# Patient Record
Sex: FEMALE | Race: WHITE | NOT HISPANIC OR LATINO | URBAN - NONMETROPOLITAN AREA
[De-identification: names, ages, dates, MRNs, and addresses within clinical notes are randomized per-mention and may not be internally consistent; named-entity substitution may affect disease eponyms.]

---

## 2020-09-23 ENCOUNTER — OFFICE VISIT (OUTPATIENT)
Dept: PHARMACY | Facility: HOSPITAL | Age: 40
End: 2020-09-23

## 2020-09-23 VITALS
OXYGEN SATURATION: 98 % | SYSTOLIC BLOOD PRESSURE: 129 MMHG | HEART RATE: 72 BPM | WEIGHT: 270 LBS | BODY MASS INDEX: 40.92 KG/M2 | DIASTOLIC BLOOD PRESSURE: 85 MMHG | HEIGHT: 68 IN

## 2020-09-23 DIAGNOSIS — B19.10 HEPATITIS B VIRUS INFECTION, UNSPECIFIED CHRONICITY: ICD-10-CM

## 2020-09-23 DIAGNOSIS — Z82.49 FAMILY HISTORY OF HEART DISEASE: ICD-10-CM

## 2020-09-23 DIAGNOSIS — R01.1 HEART MURMUR: ICD-10-CM

## 2020-09-23 DIAGNOSIS — B18.2 CHRONIC HEPATITIS C WITHOUT HEPATIC COMA (HCC): Primary | ICD-10-CM

## 2020-09-23 LAB
ALBUMIN SERPL-MCNC: 3.8 G/DL (ref 3.5–5.2)
ALBUMIN/GLOB SERPL: 1.1 G/DL
ALP SERPL-CCNC: 100 U/L (ref 39–117)
ALPHA-FETOPROTEIN: 1.64 NG/ML (ref 0–8.3)
ALT SERPL W P-5'-P-CCNC: 67 U/L (ref 1–33)
ANION GAP SERPL CALCULATED.3IONS-SCNC: 5.3 MMOL/L (ref 5–15)
AST SERPL-CCNC: 60 U/L (ref 1–32)
BACTERIA UR QL AUTO: ABNORMAL /HPF
BASOPHILS # BLD AUTO: 0.05 10*3/MM3 (ref 0–0.2)
BASOPHILS NFR BLD AUTO: 0.9 % (ref 0–1.5)
BILIRUB SERPL-MCNC: 0.3 MG/DL (ref 0–1.2)
BILIRUB UR QL STRIP: NEGATIVE
BUN SERPL-MCNC: 12 MG/DL (ref 6–20)
BUN/CREAT SERPL: 22.2 (ref 7–25)
CALCIUM SPEC-SCNC: 8.4 MG/DL (ref 8.6–10.5)
CHLORIDE SERPL-SCNC: 106 MMOL/L (ref 98–107)
CHOLEST SERPL-MCNC: 157 MG/DL (ref 0–200)
CLARITY UR: CLEAR
CO2 SERPL-SCNC: 25.7 MMOL/L (ref 22–29)
COLOR UR: YELLOW
CREAT SERPL-MCNC: 0.54 MG/DL (ref 0.57–1)
DEPRECATED RDW RBC AUTO: 42.9 FL (ref 37–54)
EOSINOPHIL # BLD AUTO: 0.22 10*3/MM3 (ref 0–0.4)
EOSINOPHIL NFR BLD AUTO: 3.8 % (ref 0.3–6.2)
ERYTHROCYTE [DISTWIDTH] IN BLOOD BY AUTOMATED COUNT: 13.2 % (ref 12.3–15.4)
GFR SERPL CREATININE-BSD FRML MDRD: 124 ML/MIN/1.73
GLOBULIN UR ELPH-MCNC: 3.6 GM/DL
GLUCOSE SERPL-MCNC: 74 MG/DL (ref 65–99)
GLUCOSE UR STRIP-MCNC: NEGATIVE MG/DL
HCG SERPL QL: NEGATIVE
HCT VFR BLD AUTO: 41.9 % (ref 34–46.6)
HDLC SERPL-MCNC: 58 MG/DL (ref 40–60)
HGB BLD-MCNC: 13.7 G/DL (ref 12–15.9)
HGB UR QL STRIP.AUTO: ABNORMAL
HIV1+2 AB SER QL: NORMAL
HYALINE CASTS UR QL AUTO: ABNORMAL /LPF
IMM GRANULOCYTES # BLD AUTO: 0.02 10*3/MM3 (ref 0–0.05)
IMM GRANULOCYTES NFR BLD AUTO: 0.3 % (ref 0–0.5)
INR PPP: 0.98 (ref 0.9–1.1)
KETONES UR QL STRIP: NEGATIVE
LDLC SERPL CALC-MCNC: 87 MG/DL (ref 0–100)
LDLC/HDLC SERPL: 1.5 {RATIO}
LEUKOCYTE ESTERASE UR QL STRIP.AUTO: NEGATIVE
LYMPHOCYTES # BLD AUTO: 1.78 10*3/MM3 (ref 0.7–3.1)
LYMPHOCYTES NFR BLD AUTO: 30.7 % (ref 19.6–45.3)
MCH RBC QN AUTO: 28.6 PG (ref 26.6–33)
MCHC RBC AUTO-ENTMCNC: 32.7 G/DL (ref 31.5–35.7)
MCV RBC AUTO: 87.5 FL (ref 79–97)
MONOCYTES # BLD AUTO: 0.43 10*3/MM3 (ref 0.1–0.9)
MONOCYTES NFR BLD AUTO: 7.4 % (ref 5–12)
NEUTROPHILS NFR BLD AUTO: 3.3 10*3/MM3 (ref 1.7–7)
NEUTROPHILS NFR BLD AUTO: 56.9 % (ref 42.7–76)
NITRITE UR QL STRIP: NEGATIVE
NRBC BLD AUTO-RTO: 0 /100 WBC (ref 0–0.2)
PH UR STRIP.AUTO: 8.5 [PH] (ref 5–8)
PLATELET # BLD AUTO: 144 10*3/MM3 (ref 140–450)
PMV BLD AUTO: 11.2 FL (ref 6–12)
POTASSIUM SERPL-SCNC: 4.3 MMOL/L (ref 3.5–5.2)
PROT SERPL-MCNC: 7.4 G/DL (ref 6–8.5)
PROT UR QL STRIP: NEGATIVE
PROTHROMBIN TIME: 12.8 SECONDS (ref 11.9–14.1)
RBC # BLD AUTO: 4.79 10*6/MM3 (ref 3.77–5.28)
RBC # UR: ABNORMAL /HPF
REF LAB TEST METHOD: ABNORMAL
SODIUM SERPL-SCNC: 137 MMOL/L (ref 136–145)
SP GR UR STRIP: 1.01 (ref 1–1.03)
SQUAMOUS #/AREA URNS HPF: ABNORMAL /HPF
TRIGL SERPL-MCNC: 59 MG/DL (ref 0–150)
TSH SERPL DL<=0.05 MIU/L-ACNC: 3.4 UIU/ML (ref 0.27–4.2)
UROBILINOGEN UR QL STRIP: ABNORMAL
VLDLC SERPL-MCNC: 11.8 MG/DL (ref 5–40)
WBC # BLD AUTO: 5.8 10*3/MM3 (ref 3.4–10.8)
WBC UR QL AUTO: ABNORMAL /HPF

## 2020-09-23 PROCEDURE — 87340 HEPATITIS B SURFACE AG IA: CPT

## 2020-09-23 PROCEDURE — 99204 OFFICE O/P NEW MOD 45 MIN: CPT | Performed by: PHYSICIAN ASSISTANT

## 2020-09-23 PROCEDURE — 86707 HEPATITIS BE ANTIBODY: CPT

## 2020-09-23 PROCEDURE — 87522 HEPATITIS C REVRS TRNSCRPJ: CPT

## 2020-09-23 PROCEDURE — 80053 COMPREHEN METABOLIC PANEL: CPT

## 2020-09-23 PROCEDURE — 80061 LIPID PANEL: CPT

## 2020-09-23 PROCEDURE — 86704 HEP B CORE ANTIBODY TOTAL: CPT

## 2020-09-23 PROCEDURE — 86692 HEPATITIS DELTA AGENT ANTBDY: CPT

## 2020-09-23 PROCEDURE — 84443 ASSAY THYROID STIM HORMONE: CPT

## 2020-09-23 PROCEDURE — 85025 COMPLETE CBC W/AUTO DIFF WBC: CPT

## 2020-09-23 PROCEDURE — 84703 CHORIONIC GONADOTROPIN ASSAY: CPT

## 2020-09-23 PROCEDURE — 36415 COLL VENOUS BLD VENIPUNCTURE: CPT

## 2020-09-23 PROCEDURE — 81001 URINALYSIS AUTO W/SCOPE: CPT

## 2020-09-23 PROCEDURE — 81596 NFCT DS CHRNC HCV 6 ASSAYS: CPT

## 2020-09-23 PROCEDURE — 87517 HEPATITIS B DNA QUANT: CPT

## 2020-09-23 PROCEDURE — 86705 HEP B CORE ANTIBODY IGM: CPT

## 2020-09-23 PROCEDURE — 86708 HEPATITIS A ANTIBODY: CPT

## 2020-09-23 PROCEDURE — 87350 HEPATITIS BE AG IA: CPT

## 2020-09-23 PROCEDURE — G0432 EIA HIV-1/HIV-2 SCREEN: HCPCS

## 2020-09-23 PROCEDURE — 82105 ALPHA-FETOPROTEIN SERUM: CPT

## 2020-09-23 PROCEDURE — 85610 PROTHROMBIN TIME: CPT

## 2020-09-23 PROCEDURE — 86706 HEP B SURFACE ANTIBODY: CPT

## 2020-09-23 RX ORDER — HYDROXYZINE PAMOATE 50 MG/1
50 CAPSULE ORAL 2 TIMES DAILY
COMMUNITY

## 2020-09-23 RX ORDER — MELOXICAM 7.5 MG/1
7.5 TABLET ORAL 2 TIMES DAILY
COMMUNITY

## 2020-09-23 RX ORDER — METHOCARBAMOL 750 MG/1
750 TABLET, FILM COATED ORAL 3 TIMES DAILY
COMMUNITY

## 2020-09-23 RX ORDER — ESCITALOPRAM OXALATE 20 MG/1
20 TABLET ORAL DAILY
COMMUNITY

## 2020-09-23 RX ORDER — BUPRENORPHINE HYDROCHLORIDE AND NALOXONE HYDROCHLORIDE DIHYDRATE 8; 2 MG/1; MG/1
1 TABLET SUBLINGUAL 2 TIMES DAILY
COMMUNITY

## 2020-09-23 NOTE — PROGRESS NOTES
: 1980    Chief Complaint   Patient presents with   • Hepatitis C   • Hepatitis B       Crystal Cerrato is a 39 y.o. female who presents to the office today as a new patient (self- referred) for evaluation of Hepatitis C and Hepatitis B.    History of Present Illness:  She found out about having Hepatitis C infection approx 15 years ago. She has not had prior treatment for hepatitis. Also has chronic Hepatitis B infection per her report, known for the past 1 year. She recently completed drug rehab, was living at [facility] for 2 months, has been clean now for 90 days. No known family history of liver disease or cirrhosis. She admits to previous IVDU and intranasal drug use. She does have nonprofessional tattoos. Admits to having previous alcoholism, drank daily for more than 5 years. She does not currently drink alcohol. She denies current illicit drug use including marijuana. No recent liver imaging. She has not had recent labs. She has had previous vaccinations for Hepatitis A but that was during the time that she was told she had acute hepatitis A, B and C. She is currently unemployed, disabled. The patient receives support from her friends that she is living with.    Review of Systems   Constitutional: Positive for fatigue. Negative for chills and fever.   HENT: Negative for trouble swallowing.    Eyes: Negative.    Respiratory: Negative for cough, choking, chest tightness and shortness of breath.    Cardiovascular: Negative for chest pain.   Gastrointestinal: Positive for abdominal distention, abdominal pain, constipation and nausea. Negative for anal bleeding, blood in stool, diarrhea and vomiting.   Genitourinary: Negative for difficulty urinating.   Musculoskeletal: Positive for back pain. Negative for neck pain.   Skin: Negative for color change, pallor, rash and wound.   Allergic/Immunologic: Negative for environmental allergies and food allergies.   Neurological: Positive for  light-headedness. Negative for dizziness and headaches.   Hematological: Does not bruise/bleed easily.   Psychiatric/Behavioral: Negative.      I have reviewed and confirmed the accuracy of the ROS as documented by the MA/LPN/RN Marisela Mesa PA-C    Past Medical History:   Diagnosis Date   • Bipolar disorder, unspecified (CMS/HCC)    • Chronic upper extremity pain    • COPD (chronic obstructive pulmonary disease) (CMS/HCC)    • Drug abuse (CMS/HCC)    • Hepatitis B, chronic (CMS/HCC)    • Hepatitis C infection    • PTSD (post-traumatic stress disorder)    • Viral hepatitis A        Past Surgical History:   Procedure Laterality Date   • CHOLECYSTECTOMY     • MANDIBLE SURGERY     • SHOULDER SURGERY      x3, right       Family History   Problem Relation Age of Onset   • Diabetes Mother    • Hypertension Mother    • Heart attack Mother    • Diabetes Father    • Hypertension Father    • Mental illness Sister    • Hepatitis Brother        Social History     Socioeconomic History   • Marital status: Unknown     Spouse name: Not on file   • Number of children: Not on file   • Years of education: Not on file   • Highest education level: Not on file   Tobacco Use   • Smoking status: Current Every Day Smoker     Packs/day: 1.00     Years: 30.00     Pack years: 30.00     Types: Cigarettes   • Smokeless tobacco: Never Used   Substance and Sexual Activity   • Alcohol use: Not Currently     Comment: previous alcoholism   • Drug use: Not Currently     Comment: previous drug use   • Sexual activity: Defer       Current Outpatient Medications:   •  ALBUTEROL IN, Inhale As Needed., Disp: , Rfl:   •  buprenorphine-naloxone (SUBOXONE) 8-2 MG per SL tablet, Place 1 tablet under the tongue 2 (two) times a day., Disp: , Rfl:   •  Cariprazine HCl 6 MG capsule, Take 6 mg by mouth Daily., Disp: , Rfl:   •  escitalopram (LEXAPRO) 20 MG tablet, Take 20 mg by mouth Daily., Disp: , Rfl:   •  Fluticasone Furoate-Vilanterol (BREO ELLIPTA IN),  "Inhale Daily., Disp: , Rfl:   •  hydrOXYzine pamoate (VISTARIL) 50 MG capsule, Take 50 mg by mouth 2 (two) times a day., Disp: , Rfl:   •  meloxicam (MOBIC) 7.5 MG tablet, Take 7.5 mg by mouth 2 (two) times a day., Disp: , Rfl:   •  methocarbamol (ROBAXIN) 750 MG tablet, Take 750 mg by mouth 3 (Three) Times a Day., Disp: , Rfl:   •  VB-Iabmomoeukt-Xlsah-DM (RESPERAL PO), Take 3 mg by mouth Daily., Disp: , Rfl:     Allergies:   Seroquel [quetiapine], Tylenol pm extra [diphenhydramine-apap (sleep)], and Zanaflex [tizanidine]    Vitals:  /85   Pulse 72   Ht 172.7 cm (68\")   Wt 122 kg (270 lb)   SpO2 98%   BMI 41.05 kg/m²     Physical Exam  Vitals signs reviewed.   Constitutional:       General: She is not in acute distress.     Appearance: Normal appearance. She is well-developed. She is not ill-appearing or diaphoretic.   HENT:      Head: Normocephalic and atraumatic.      Right Ear: External ear normal.      Left Ear: External ear normal.      Nose: Nose normal.      Mouth/Throat:      Comments: Wearing a mask  Eyes:      General: No scleral icterus.        Right eye: No discharge.         Left eye: No discharge.      Conjunctiva/sclera: Conjunctivae normal.   Neck:      Musculoskeletal: Normal range of motion.      Vascular: No JVD.   Cardiovascular:      Rate and Rhythm: Normal rate and regular rhythm.      Heart sounds: Murmur (systolic, grade 3/6) present. No friction rub. No gallop.    Pulmonary:      Effort: Pulmonary effort is normal. No respiratory distress.      Breath sounds: Normal breath sounds. No wheezing or rales.   Chest:      Chest wall: No tenderness.   Abdominal:      General: Bowel sounds are normal. There is no distension.      Palpations: Abdomen is soft. There is no mass.      Tenderness: There is no abdominal tenderness. There is no guarding.   Musculoskeletal: Normal range of motion.         General: No deformity.   Skin:     General: Skin is warm and dry.      Findings: No " erythema or rash.   Neurological:      Mental Status: She is alert and oriented to person, place, and time.      Coordination: Coordination normal.   Psychiatric:         Mood and Affect: Mood normal.         Behavior: Behavior normal.         Thought Content: Thought content normal.         Judgment: Judgment normal.       Results Review:  None available    Assessment:  1. Chronic hepatitis C without hepatic coma (CMS/HCC)    2. Hepatitis B virus infection, unspecified chronicity    3. Heart murmur    4. Family history of heart disease      Plan:  Orders Placed This Encounter   Procedures   • US Liver   • AFP Tumor Marker   • Comprehensive Metabolic Panel   • hCG, Serum, Qualitative   • HCV FibroSURE   • HCV RNA By PCR, Qn Rfx Ofelia   • Hepatitis A Antibody, Total   • HIV-1 / O / 2 Ag / Antibody 4th Generation   • Protime-INR   • TSH   • Hepatitis B DNA, Quantitative, PCR   • Hepatitis B Virus Profile   • Hepatitis Delta Virus   • Lipid Panel   • Urinalysis With Microscopic If Indicated (No Culture) - Urine, Clean Catch   • CBC Auto Differential   • Urinalysis, Microscopic Only - Urine, Clean Catch   • Ambulatory Referral to Cardiology   • CBC & Differential     Will complete labs and imaging for further evaluation. Will need treatment of Hepatitis B virus if chronic first before treating Hepatitis C infection. Will discuss more in detail when results have been reviewed.     Referral to cardiology due to new heart murmur and family history.         Return in about 2 weeks (around 10/7/2020) for discussion of results.      Electronically signed 9/24/2020 17:13 GEORGET  Marisela Mesa PA-C  [facility], [place]

## 2020-09-24 ENCOUNTER — TELEPHONE (OUTPATIENT)
Dept: GASTROENTEROLOGY | Facility: CLINIC | Age: 40
End: 2020-09-24

## 2020-09-24 LAB — HAV AB SER QL IA: POSITIVE

## 2020-09-25 LAB
HCV GENTYP SERPL NAA+PROBE: NORMAL
HCV RNA SERPL NAA+PROBE-ACNC: 110 IU/ML
HCV RNA SERPL NAA+PROBE-LOG IU: 2.04 LOG10 IU/ML
REF LAB TEST REF RANGE: NORMAL

## 2020-09-28 LAB
HBV CORE AB SERPL QL IA: POSITIVE
HBV CORE IGM SERPL QL IA: NEGATIVE
HBV E AB SERPL QL IA: NEGATIVE
HBV E AG SERPL QL IA: POSITIVE
HBV SURFACE AB SER QL: NON REACTIVE
HBV SURFACE AG SERPL QL IA: POSITIVE

## 2020-09-29 LAB
A2 MACROGLOB SERPL-MCNC: 250 MG/DL (ref 110–276)
ALT SERPL W P-5'-P-CCNC: 64 IU/L (ref 0–40)
APO A-I SERPL-MCNC: 152 MG/DL (ref 116–209)
BILIRUB SERPL-MCNC: 0.3 MG/DL (ref 0–1.2)
FIBROSIS SCORING:: ABNORMAL
FIBROSIS STAGE SERPL QL: ABNORMAL
GGT SERPL-CCNC: 15 IU/L (ref 0–60)
HAPTOGLOB SERPL-MCNC: 101 MG/DL (ref 42–296)
HCV AB SER QL: ABNORMAL
LABORATORY COMMENT REPORT: ABNORMAL
LIVER FIBR SCORE SERPL CALC.FIBROSURE: 0.12 (ref 0–0.21)
NECROINFLAMM ACTIVITY SCORING:: ABNORMAL
NECROINFLAMMATORY ACT GRADE SERPL QL: ABNORMAL
NECROINFLAMMATORY ACT SCORE SERPL: 0.32 (ref 0–0.17)
SERVICE CMNT-IMP: ABNORMAL

## 2020-09-30 ENCOUNTER — TELEPHONE (OUTPATIENT)
Dept: CARDIOLOGY | Facility: CLINIC | Age: 40
End: 2020-09-30

## 2020-09-30 LAB — HDV AB SER QL IA: NEGATIVE

## 2020-09-30 NOTE — TELEPHONE ENCOUNTER
Left message on answering machine requesting patient return my call.  Patient needs a new patient appointment.

## 2020-10-02 ENCOUNTER — HOSPITAL ENCOUNTER (OUTPATIENT)
Dept: ULTRASOUND IMAGING | Facility: HOSPITAL | Age: 40
Discharge: HOME | End: 2020-10-02
Admitting: PHYSICIAN ASSISTANT

## 2020-10-02 ENCOUNTER — TELEPHONE (OUTPATIENT)
Dept: GASTROENTEROLOGY | Facility: CLINIC | Age: 40
End: 2020-10-02

## 2020-10-02 DIAGNOSIS — K76.9 LESION OF LIVER GREATER THAN 1 CM IN DIAMETER: Primary | ICD-10-CM

## 2020-10-02 DIAGNOSIS — B19.10 HEPATITIS B VIRUS INFECTION, UNSPECIFIED CHRONICITY: ICD-10-CM

## 2020-10-02 DIAGNOSIS — B18.2 CHRONIC HEPATITIS C WITHOUT HEPATIC COMA (HCC): ICD-10-CM

## 2020-10-02 PROCEDURE — 76705 ECHO EXAM OF ABDOMEN: CPT | Performed by: RADIOLOGY

## 2020-10-02 PROCEDURE — 76705 ECHO EXAM OF ABDOMEN: CPT

## 2020-10-02 NOTE — TELEPHONE ENCOUNTER
I have tried to reach patient several times regarding another result. I am going to mail patient a letter requesting her to call the office.

## 2020-10-05 LAB
HBV DNA SERPL NAA+PROBE-ACNC: NORMAL IU/ML
HBV DNA SERPL NAA+PROBE-ACNC: NORMAL IU/ML
HBV DNA SERPL NAA+PROBE-LOG IU: 8.6 LOG10 IU/ML
REF LAB TEST REF RANGE: NORMAL

## 2020-10-07 ENCOUNTER — TELEPHONE (OUTPATIENT)
Dept: GASTROENTEROLOGY | Facility: CLINIC | Age: 40
End: 2020-10-07

## 2020-10-07 ENCOUNTER — OFFICE VISIT (OUTPATIENT)
Dept: PHARMACY | Facility: HOSPITAL | Age: 40
End: 2020-10-07

## 2020-10-07 VITALS
OXYGEN SATURATION: 96 % | SYSTOLIC BLOOD PRESSURE: 116 MMHG | DIASTOLIC BLOOD PRESSURE: 78 MMHG | BODY MASS INDEX: 40.92 KG/M2 | HEIGHT: 68 IN | HEART RATE: 93 BPM | WEIGHT: 270 LBS

## 2020-10-07 DIAGNOSIS — B18.2 CHRONIC HEPATITIS C WITHOUT HEPATIC COMA (HCC): ICD-10-CM

## 2020-10-07 DIAGNOSIS — B18.1 HEPATITIS B, CHRONIC (HCC): Primary | ICD-10-CM

## 2020-10-07 DIAGNOSIS — K76.9 LIVER LESION: ICD-10-CM

## 2020-10-07 DIAGNOSIS — Z78.9 IMMUNE TO HEPATITIS A: ICD-10-CM

## 2020-10-07 PROCEDURE — 99214 OFFICE O/P EST MOD 30 MIN: CPT | Performed by: PHYSICIAN ASSISTANT

## 2020-10-07 RX ORDER — GABAPENTIN 800 MG/1
800 TABLET ORAL 3 TIMES DAILY
COMMUNITY

## 2020-10-07 RX ORDER — ENTECAVIR 0.5 MG/1
0.5 TABLET, FILM COATED ORAL DAILY
Qty: 90 TABLET | Refills: 3 | Status: SHIPPED | OUTPATIENT
Start: 2020-10-07

## 2020-10-07 NOTE — PROGRESS NOTES
: 1980    Chief Complaint   Patient presents with   • Hepatitis B   • Hepatitis C       Crystal Cerrato is a 40 y.o. female who presents to the office today as a follow up appointment regarding Hepatitis C.    History of Present Illness:  She completed labs and imaging as ordered last visit and would like to discuss those results today.  She has an appointment with a cardiologist for evaluation of her new found heart murmur.  He would like to get started on treatment as soon as possible.  She continues to abstain from alcohol and illicit drug use. MRI was ordered since last visit, she has not scheduled yet and has a new contact number. She was on her period at the time of last UA per her report.    Previous history:  She found out about having Hepatitis C infection approx 15 years ago. She has not had prior treatment for hepatitis. Also has chronic Hepatitis B infection per her report, known for the past 1 year. She recently completed drug rehab, was living at [facility] for 2 months, has been clean now for 90 days. No known family history of liver disease or cirrhosis. She admits to previous IVDU and intranasal drug use. She does have nonprofessional tattoos. Admits to having previous alcoholism, drank daily for more than 5 years. She does not currently drink alcohol. She denies current illicit drug use including marijuana. No recent liver imaging. She has not had recent labs. She has had previous vaccinations for Hepatitis A but that was during the time that she was told she had acute hepatitis A, B and C. She is currently unemployed, disabled. The patient receives support from her friends that she is living with.    Review of Systems   Constitutional: Positive for fatigue. Negative for chills and fever.   HENT: Negative for trouble swallowing.    Eyes: Negative.    Respiratory: Negative for cough, choking, chest tightness and shortness of breath.    Cardiovascular: Negative for chest pain.      Gastrointestinal: Positive for abdominal distention, constipation and nausea. Negative for abdominal pain, anal bleeding, blood in stool, diarrhea and vomiting.   Genitourinary: Negative for difficulty urinating.   Musculoskeletal: Positive for back pain. Negative for neck pain.   Skin: Negative for color change, pallor, rash and wound.   Allergic/Immunologic: Negative for environmental allergies and food allergies.   Neurological: Positive for light-headedness. Negative for dizziness and headaches.   Hematological: Does not bruise/bleed easily.   Psychiatric/Behavioral: Negative.      I have reviewed and confirmed the accuracy of the ROS as documented by the MA/LPN/RN Marisela Mesa PA-C    Past Medical History:   Diagnosis Date   • Bipolar disorder, unspecified (CMS/HCC)    • Chronic upper extremity pain    • COPD (chronic obstructive pulmonary disease) (CMS/HCC)    • Drug abuse (CMS/HCC)    • Hepatitis B, chronic (CMS/HCC)    • Hepatitis C infection    • PTSD (post-traumatic stress disorder)    • Viral hepatitis A        Past Surgical History:   Procedure Laterality Date   • CHOLECYSTECTOMY     • MANDIBLE SURGERY     • SHOULDER SURGERY      x3, right       Family History   Problem Relation Age of Onset   • Diabetes Mother    • Hypertension Mother    • Heart attack Mother    • Diabetes Father    • Hypertension Father    • Mental illness Sister    • Hepatitis Brother        Social History     Socioeconomic History   • Marital status: Legally      Spouse name: Not on file   • Number of children: Not on file   • Years of education: Not on file   • Highest education level: Not on file   Tobacco Use   • Smoking status: Current Every Day Smoker     Packs/day: 1.00     Years: 30.00     Pack years: 30.00     Types: Cigarettes   • Smokeless tobacco: Never Used   Substance and Sexual Activity   • Alcohol use: Not Currently     Comment: previous alcoholism   • Drug use: Not Currently     Comment: previous drug use  "  • Sexual activity: Defer       Current Outpatient Medications:   •  ALBUTEROL IN, Inhale As Needed., Disp: , Rfl:   •  buprenorphine-naloxone (SUBOXONE) 8-2 MG per SL tablet, Place 1 tablet under the tongue 2 (two) times a day., Disp: , Rfl:   •  Cariprazine HCl 6 MG capsule, Take 6 mg by mouth Daily., Disp: , Rfl:   •  escitalopram (LEXAPRO) 20 MG tablet, Take 20 mg by mouth Daily., Disp: , Rfl:   •  Fluticasone Furoate-Vilanterol (BREO ELLIPTA IN), Inhale Daily., Disp: , Rfl:   •  gabapentin (NEURONTIN) 800 MG tablet, Take 800 mg by mouth 3 (Three) Times a Day., Disp: , Rfl:   •  hydrOXYzine pamoate (VISTARIL) 50 MG capsule, Take 50 mg by mouth 2 (two) times a day., Disp: , Rfl:   •  meloxicam (MOBIC) 7.5 MG tablet, Take 7.5 mg by mouth 2 (two) times a day., Disp: , Rfl:   •  methocarbamol (ROBAXIN) 750 MG tablet, Take 750 mg by mouth 3 (Three) Times a Day., Disp: , Rfl:   •  QQ-Czspzuxtljs-Etqyq-DM (RESPERAL PO), Take 3 mg by mouth Daily., Disp: , Rfl:     Allergies:   Seroquel [quetiapine], Tylenol pm extra [diphenhydramine-apap (sleep)], and Zanaflex [tizanidine]    Vitals:  /78   Pulse 93   Ht 172.7 cm (68\")   Wt 122 kg (270 lb)   SpO2 96%   BMI 41.05 kg/m²     Physical Exam  Constitutional:       General: She is not in acute distress.     Appearance: Normal appearance. She is well-developed. She is not diaphoretic.   HENT:      Head: Normocephalic and atraumatic.      Right Ear: External ear normal.      Left Ear: External ear normal.      Nose: Nose normal.      Mouth/Throat:      Comments: Wearing a mask  Eyes:      General: No scleral icterus.        Right eye: No discharge.         Left eye: No discharge.      Conjunctiva/sclera: Conjunctivae normal.   Neck:      Musculoskeletal: Normal range of motion.      Trachea: No tracheal deviation.   Pulmonary:      Effort: Pulmonary effort is normal. No respiratory distress.   Musculoskeletal: Normal range of motion.   Skin:     Coloration: Skin is " not pale.      Findings: No erythema or rash.   Neurological:      Mental Status: She is alert and oriented to person, place, and time.      Coordination: Coordination normal.   Psychiatric:         Mood and Affect: Mood normal.         Behavior: Behavior normal.         Thought Content: Thought content normal.         Judgment: Judgment normal.     Results Review:  Labs 9/23/2020: AFP 1.64, ALT 67, AST 60, alkaline phosphatase 100, total bilirubin 0.3, , pregnancy negative, fibrosis score F0, inflammation A1, HCV quant 110, hepatitis A total antibody positive, HIV negative, INR 0.98, TSH normal, CBC normal, hepatitis B DNA quant 401 million, hepatitis B virus profile shows positive hepatitis B surface antigen, positive hepatitis B antigen, positive hepatitis B core total antibody, negative hepatitis B core IgM, negative hepatitis B E antibody, negative hepatitis B surface antibody, negative hepatitis delta virus, lipid panel normal, urinalysis large amount of blood.    Ultrasound liver 10/2/2020:  There is a lesion in the posterior aspect of the liver  measuring about 2.3 cm that could represent a simple cyst, however MRI  multiphase is recommended for further evaluation.    Assessment:  1. Hepatitis B, chronic (CMS/HCC)    2. Chronic hepatitis C without hepatic coma (CMS/HCC)    3. Liver lesion    4. Immune to hepatitis A      Plan:  Will have MRI abd for further evaluation of liver lesion found on ultrasound.  Discussed all of her recent lab results with her in detail today.  She does have chronic hepatitis C infection but she has a very low virus number out of 110.  We will treat chronic hepatitis B infection first with virus suppressing medication.  She understands that this medication is to be taken daily without missing any doses and will be taken indefinitely.  She will have labs every 3 months for the first 1 year of therapy and every 6 months thereafter.  She agrees to this plan and will have  monitoring as directed.  We will recheck hepatitis C viral load in 3 to 6 months.  She will continue to abstain from alcohol and illicit drug use.  She is immune to hepatitis A and will not need any vaccinations.        Return in about 3 months (around 1/7/2021) for recheck Hep B.      Electronically signed 10/9/2020 17:01 AGUSTIN Mesa PA-C  [facility]

## 2020-10-09 PROBLEM — B18.1 HEPATITIS B, CHRONIC (HCC): Status: ACTIVE | Noted: 2020-10-09

## 2020-10-09 PROBLEM — B18.2 CHRONIC HEPATITIS C WITHOUT HEPATIC COMA (HCC): Status: ACTIVE | Noted: 2020-10-09

## 2020-10-09 PROBLEM — Z78.9 IMMUNE TO HEPATITIS A: Status: ACTIVE | Noted: 2020-10-09

## 2020-10-16 ENCOUNTER — HOSPITAL ENCOUNTER (OUTPATIENT)
Dept: MRI IMAGING | Facility: HOSPITAL | Age: 40
End: 2020-10-16

## 2020-10-29 ENCOUNTER — RESULTS ENCOUNTER (OUTPATIENT)
Dept: GASTROENTEROLOGY | Facility: CLINIC | Age: 40
End: 2020-10-29

## 2020-10-29 DIAGNOSIS — B18.1 HEPATITIS B, CHRONIC (HCC): Primary | ICD-10-CM

## 2020-10-29 DIAGNOSIS — B18.1 HEPATITIS B, CHRONIC (HCC): ICD-10-CM

## 2020-11-02 ENCOUNTER — TELEPHONE (OUTPATIENT)
Dept: GASTROENTEROLOGY | Facility: CLINIC | Age: 40
End: 2020-11-02

## 2020-11-02 ENCOUNTER — HOSPITAL ENCOUNTER (OUTPATIENT)
Dept: MRI IMAGING | Facility: HOSPITAL | Age: 40
Discharge: HOME | End: 2020-11-02
Admitting: PHYSICIAN ASSISTANT

## 2020-11-02 DIAGNOSIS — K76.9 LESION OF LIVER GREATER THAN 1 CM IN DIAMETER: ICD-10-CM

## 2020-11-02 DIAGNOSIS — B19.10 HEPATITIS B VIRUS INFECTION, UNSPECIFIED CHRONICITY: ICD-10-CM

## 2020-11-02 DIAGNOSIS — B18.2 CHRONIC HEPATITIS C WITHOUT HEPATIC COMA (HCC): ICD-10-CM

## 2020-11-02 PROCEDURE — 0 GADOBENATE DIMEGLUMINE 529 MG/ML SOLUTION

## 2020-11-02 PROCEDURE — A9577 INJ MULTIHANCE: HCPCS | Performed by: PHYSICIAN ASSISTANT

## 2020-11-02 PROCEDURE — A9577 INJ MULTIHANCE: HCPCS

## 2020-11-02 PROCEDURE — 74183 MRI ABD W/O CNTR FLWD CNTR: CPT

## 2020-11-02 PROCEDURE — 74183 MRI ABD W/O CNTR FLWD CNTR: CPT | Performed by: RADIOLOGY

## 2020-11-02 PROCEDURE — 0 GADOBENATE DIMEGLUMINE 529 MG/ML SOLUTION: Performed by: PHYSICIAN ASSISTANT

## 2020-11-02 RX ADMIN — GADOBENATE DIMEGLUMINE 20 ML: 529 INJECTION, SOLUTION INTRAVENOUS at 10:15

## 2020-11-02 NOTE — TELEPHONE ENCOUNTER
MRI does not show any liver lesion, this is great news. Please let her know. Continue Hep B medication as prescribed and keep scheduled appt for labs. Needs appt 1 week after labs completed for discussion.

## 2020-11-06 NOTE — TELEPHONE ENCOUNTER
Unable to contact patient. Have tried multiple times. I will send a letter out to her today to contact us.

## 2021-01-07 ENCOUNTER — LAB (OUTPATIENT)
Dept: LAB | Facility: HOSPITAL | Age: 41
End: 2021-01-07

## 2021-01-07 DIAGNOSIS — B18.1 HEPATITIS B, CHRONIC (HCC): ICD-10-CM

## 2021-01-07 LAB
ALBUMIN SERPL-MCNC: 4.09 G/DL (ref 3.5–5.2)
ALBUMIN/GLOB SERPL: 1.1 G/DL
ALP SERPL-CCNC: 146 U/L (ref 39–117)
ALT SERPL W P-5'-P-CCNC: 31 U/L (ref 1–33)
ANION GAP SERPL CALCULATED.3IONS-SCNC: 10.6 MMOL/L (ref 5–15)
AST SERPL-CCNC: 33 U/L (ref 1–32)
BACTERIA UR QL AUTO: ABNORMAL /HPF
BILIRUB SERPL-MCNC: 0.2 MG/DL (ref 0–1.2)
BILIRUB UR QL STRIP: NEGATIVE
BUN SERPL-MCNC: 9 MG/DL (ref 6–20)
BUN/CREAT SERPL: 17 (ref 7–25)
CALCIUM SPEC-SCNC: 9.2 MG/DL (ref 8.6–10.5)
CHLORIDE SERPL-SCNC: 101 MMOL/L (ref 98–107)
CHOLEST SERPL-MCNC: 178 MG/DL (ref 0–200)
CLARITY UR: CLEAR
CO2 SERPL-SCNC: 27.4 MMOL/L (ref 22–29)
COLOR UR: YELLOW
CREAT SERPL-MCNC: 0.53 MG/DL (ref 0.57–1)
GFR SERPL CREATININE-BSD FRML MDRD: 127 ML/MIN/1.73
GLOBULIN UR ELPH-MCNC: 3.8 GM/DL
GLUCOSE SERPL-MCNC: 85 MG/DL (ref 65–99)
GLUCOSE UR STRIP-MCNC: NEGATIVE MG/DL
HBV SURFACE AG SERPL QL IA: NORMAL
HDLC SERPL-MCNC: 65 MG/DL (ref 40–60)
HGB UR QL STRIP.AUTO: NEGATIVE
HYALINE CASTS UR QL AUTO: ABNORMAL /LPF
KETONES UR QL STRIP: NEGATIVE
LDLC SERPL CALC-MCNC: 99 MG/DL (ref 0–100)
LDLC/HDLC SERPL: 1.51 {RATIO}
LEUKOCYTE ESTERASE UR QL STRIP.AUTO: ABNORMAL
NITRITE UR QL STRIP: NEGATIVE
PH UR STRIP.AUTO: 7.5 [PH] (ref 5–8)
POTASSIUM SERPL-SCNC: 4.2 MMOL/L (ref 3.5–5.2)
PROT SERPL-MCNC: 7.9 G/DL (ref 6–8.5)
PROT UR QL STRIP: NEGATIVE
RBC # UR: ABNORMAL /HPF
REF LAB TEST METHOD: ABNORMAL
SODIUM SERPL-SCNC: 139 MMOL/L (ref 136–145)
SP GR UR STRIP: 1.01 (ref 1–1.03)
SQUAMOUS #/AREA URNS HPF: ABNORMAL /HPF
TRIGL SERPL-MCNC: 74 MG/DL (ref 0–150)
UROBILINOGEN UR QL STRIP: ABNORMAL
VLDLC SERPL-MCNC: 14 MG/DL (ref 5–40)
WBC UR QL AUTO: ABNORMAL /HPF

## 2021-01-07 PROCEDURE — 80053 COMPREHEN METABOLIC PANEL: CPT | Performed by: PHYSICIAN ASSISTANT

## 2021-01-07 PROCEDURE — 36415 COLL VENOUS BLD VENIPUNCTURE: CPT | Performed by: PHYSICIAN ASSISTANT

## 2021-01-07 PROCEDURE — 87517 HEPATITIS B DNA QUANT: CPT | Performed by: PHYSICIAN ASSISTANT

## 2021-01-07 PROCEDURE — 81001 URINALYSIS AUTO W/SCOPE: CPT

## 2021-01-07 PROCEDURE — 80061 LIPID PANEL: CPT | Performed by: PHYSICIAN ASSISTANT

## 2021-01-07 PROCEDURE — 87350 HEPATITIS BE AG IA: CPT | Performed by: PHYSICIAN ASSISTANT

## 2021-01-07 PROCEDURE — 87340 HEPATITIS B SURFACE AG IA: CPT | Performed by: PHYSICIAN ASSISTANT

## 2021-01-08 LAB
HBV E AG SERPL QL IA: POSITIVE
HBV SURFACE AG SERPL QL IA: NORMAL

## 2021-01-13 ENCOUNTER — OFFICE VISIT (OUTPATIENT)
Dept: PHARMACY | Facility: HOSPITAL | Age: 41
End: 2021-01-13

## 2021-01-13 VITALS
OXYGEN SATURATION: 93 % | WEIGHT: 270 LBS | DIASTOLIC BLOOD PRESSURE: 86 MMHG | HEIGHT: 68 IN | SYSTOLIC BLOOD PRESSURE: 135 MMHG | HEART RATE: 86 BPM | BODY MASS INDEX: 40.92 KG/M2

## 2021-01-13 DIAGNOSIS — B18.1 CHRONIC HEPATITIS B (HCC): Primary | ICD-10-CM

## 2021-01-13 DIAGNOSIS — R74.8 ELEVATED ALKALINE PHOSPHATASE LEVEL: ICD-10-CM

## 2021-01-13 LAB
BASOPHILS # BLD AUTO: 0.06 10*3/MM3 (ref 0–0.2)
BASOPHILS NFR BLD AUTO: 0.8 % (ref 0–1.5)
DEPRECATED RDW RBC AUTO: 42.1 FL (ref 37–54)
EOSINOPHIL # BLD AUTO: 0.25 10*3/MM3 (ref 0–0.4)
EOSINOPHIL NFR BLD AUTO: 3.3 % (ref 0.3–6.2)
ERYTHROCYTE [DISTWIDTH] IN BLOOD BY AUTOMATED COUNT: 13.5 % (ref 12.3–15.4)
HBV SURFACE AG SERPL QL IA: NORMAL
HCT VFR BLD AUTO: 43.8 % (ref 34–46.6)
HGB BLD-MCNC: 14.5 G/DL (ref 12–15.9)
IMM GRANULOCYTES # BLD AUTO: 0.04 10*3/MM3 (ref 0–0.05)
IMM GRANULOCYTES NFR BLD AUTO: 0.5 % (ref 0–0.5)
LYMPHOCYTES # BLD AUTO: 2.18 10*3/MM3 (ref 0.7–3.1)
LYMPHOCYTES NFR BLD AUTO: 28.6 % (ref 19.6–45.3)
MCH RBC QN AUTO: 28.6 PG (ref 26.6–33)
MCHC RBC AUTO-ENTMCNC: 33.1 G/DL (ref 31.5–35.7)
MCV RBC AUTO: 86.4 FL (ref 79–97)
MONOCYTES # BLD AUTO: 0.49 10*3/MM3 (ref 0.1–0.9)
MONOCYTES NFR BLD AUTO: 6.4 % (ref 5–12)
NEUTROPHILS NFR BLD AUTO: 4.61 10*3/MM3 (ref 1.7–7)
NEUTROPHILS NFR BLD AUTO: 60.4 % (ref 42.7–76)
NRBC BLD AUTO-RTO: 0 /100 WBC (ref 0–0.2)
PLATELET # BLD AUTO: 232 10*3/MM3 (ref 140–450)
PMV BLD AUTO: 10.1 FL (ref 6–12)
RBC # BLD AUTO: 5.07 10*6/MM3 (ref 3.77–5.28)
WBC # BLD AUTO: 7.63 10*3/MM3 (ref 3.4–10.8)

## 2021-01-13 PROCEDURE — 83516 IMMUNOASSAY NONANTIBODY: CPT

## 2021-01-13 PROCEDURE — 99214 OFFICE O/P EST MOD 30 MIN: CPT | Performed by: PHYSICIAN ASSISTANT

## 2021-01-13 PROCEDURE — 36415 COLL VENOUS BLD VENIPUNCTURE: CPT

## 2021-01-13 PROCEDURE — 87340 HEPATITIS B SURFACE AG IA: CPT

## 2021-01-13 PROCEDURE — 85025 COMPLETE CBC W/AUTO DIFF WBC: CPT

## 2021-01-13 PROCEDURE — 87517 HEPATITIS B DNA QUANT: CPT

## 2021-01-13 NOTE — PROGRESS NOTES
: 1980    Chief Complaint   Patient presents with   • Hepatitis B   • Hepatitis C       Crystal Cerrato is a 40 y.o. female who presents to the office today as a follow up appointment regarding Hepatitis B and Hepatitis C.    History of Present Illness:  Has been taking baraclude (entecavir 0.5 mg) 1 tab once daily since 10/2020 for treatment of chronic Hepatitis B. She has missed doses of this medication intermittently but never missed more than 1 day. She is feeling at her baseline, no new complaints today. She completed labs prior to this visit and would like to discuss those results.     Previous history:  She found out about having Hepatitis C infection approx 15 years ago. She has not had prior treatment for hepatitis. Also has chronic Hepatitis B infection per her report, known for the past 1 year. She recently completed drug rehab, was living at [facility] for 2 months, has been clean now for 90 days. No known family history of liver disease or cirrhosis. She admits to previous IVDU and intranasal drug use. She does have nonprofessional tattoos. Admits to having previous alcoholism, drank daily for more than 5 years. She does not currently drink alcohol. She denies current illicit drug use including marijuana. No recent liver imaging. She has not had recent labs. She has had previous vaccinations for Hepatitis A but that was during the time that she was told she had acute hepatitis A, B and C. She is currently unemployed, disabled. The patient receives support from her friends that she is living with.    Review of Systems   Constitutional: Positive for fatigue. Negative for chills and fever.   HENT: Negative for trouble swallowing.    Eyes: Negative.    Respiratory: Negative for cough, choking, chest tightness and shortness of breath.    Cardiovascular: Negative for chest pain.   Gastrointestinal: Positive for abdominal distention, constipation and nausea. Negative for abdominal pain, anal  bleeding, blood in stool, diarrhea and vomiting.   Genitourinary: Negative for difficulty urinating.   Musculoskeletal: Positive for back pain. Negative for neck pain.   Skin: Negative for color change, pallor, rash and wound.   Allergic/Immunologic: Negative for environmental allergies and food allergies.   Neurological: Positive for light-headedness. Negative for dizziness and headaches.   Hematological: Does not bruise/bleed easily.   Psychiatric/Behavioral: Negative.      Past Medical History:   Diagnosis Date   • Bipolar disorder, unspecified (CMS/HCC)    • Chronic upper extremity pain    • COPD (chronic obstructive pulmonary disease) (CMS/HCC)    • Drug abuse (CMS/HCC)    • Hepatitis B, chronic (CMS/HCC)    • Hepatitis C infection    • PTSD (post-traumatic stress disorder)    • Viral hepatitis A        Past Surgical History:   Procedure Laterality Date   • CHOLECYSTECTOMY     • LUMBAR SPINE SURGERY  2007   • MANDIBLE SURGERY     • SHOULDER SURGERY      x3, right       Family History   Problem Relation Age of Onset   • Diabetes Mother    • Hypertension Mother    • Heart attack Mother    • Diabetes Father    • Hypertension Father    • Mental illness Sister    • Hepatitis Brother        Social History     Socioeconomic History   • Marital status: Legally      Spouse name: Not on file   • Number of children: Not on file   • Years of education: Not on file   • Highest education level: Not on file   Tobacco Use   • Smoking status: Current Every Day Smoker     Packs/day: 1.00     Years: 30.00     Pack years: 30.00     Types: Cigarettes   • Smokeless tobacco: Never Used   Substance and Sexual Activity   • Alcohol use: Not Currently     Comment: previous alcoholism   • Drug use: Not Currently     Comment: previous drug use   • Sexual activity: Defer       Current Outpatient Medications:   •  ALBUTEROL IN, Inhale As Needed., Disp: , Rfl:   •  buprenorphine-naloxone (SUBOXONE) 8-2 MG per SL tablet, Place 1  "tablet under the tongue 2 (two) times a day., Disp: , Rfl:   •  Cariprazine HCl 6 MG capsule, Take 6 mg by mouth Daily., Disp: , Rfl:   •  entecavir (BARACLUDE) 0.5 MG tablet, Take 1 tablet by mouth Daily. on an empty stomach, Disp: 90 tablet, Rfl: 3  •  escitalopram (LEXAPRO) 20 MG tablet, Take 20 mg by mouth Daily., Disp: , Rfl:   •  Fluticasone Furoate-Vilanterol (BREO ELLIPTA IN), Inhale Daily., Disp: , Rfl:   •  gabapentin (NEURONTIN) 800 MG tablet, Take 800 mg by mouth 3 (Three) Times a Day., Disp: , Rfl:   •  hydrOXYzine pamoate (VISTARIL) 50 MG capsule, Take 50 mg by mouth 2 (two) times a day., Disp: , Rfl:   •  meloxicam (MOBIC) 7.5 MG tablet, Take 7.5 mg by mouth 2 (two) times a day., Disp: , Rfl:   •  methocarbamol (ROBAXIN) 750 MG tablet, Take 750 mg by mouth 3 (Three) Times a Day., Disp: , Rfl:   •  LK-Ncfduaybqnx-Cnkdk-DM (RESPERAL PO), Take 3 mg by mouth Daily., Disp: , Rfl:     Allergies:   Seroquel [quetiapine], Tylenol pm extra [diphenhydramine-apap (sleep)], and Zanaflex [tizanidine]    Vitals:  /86 (BP Location: Right arm, Patient Position: Sitting)   Pulse 86   Ht 172.7 cm (68\")   Wt 122 kg (270 lb)   SpO2 93%   BMI 41.05 kg/m²     Physical Exam  Constitutional:       General: She is not in acute distress.     Appearance: Normal appearance. She is well-developed. She is not diaphoretic.   HENT:      Head: Normocephalic and atraumatic.      Right Ear: External ear normal.      Left Ear: External ear normal.      Nose: Nose normal.      Mouth/Throat:      Comments: Wearing a mask  Eyes:      General: No scleral icterus.        Right eye: No discharge.         Left eye: No discharge.      Conjunctiva/sclera: Conjunctivae normal.   Neck:      Musculoskeletal: Normal range of motion.      Trachea: No tracheal deviation.   Pulmonary:      Effort: Pulmonary effort is normal. No respiratory distress.   Musculoskeletal: Normal range of motion.   Skin:     Coloration: Skin is not pale.      " Findings: No erythema or rash.   Neurological:      Mental Status: She is alert and oriented to person, place, and time.      Coordination: Coordination normal.   Psychiatric:         Behavior: Behavior normal.         Thought Content: Thought content normal.         Judgment: Judgment normal.      Comments: Anxious affect     Results Review:  Labs 1/7/2021: Creatinine 0.53, ALT 31, AST 33, alkaline phosphatase 146, total bilirubin 0.2, , hepatitis B E antigen positive, hepatitis B surface antigen not resulted, lipid panel normal other than increased HDL, urinalysis unremarkable.    MRI of the abdomen with and without contrast 11/2/2020:  1. Unremarkable MRI appearance of the liver. Specifically no imaging  findings of cirrhosis and no enhancing lesions identified. Findings seen  on prior ultrasound possibly could be related to artifact given no  concordant finding on MRI in the described abnormality location of the  posterior liver.  2. Otherwise unremarkable exam.    Labs 9/23/2020: AFP 1.64, ALT 67, AST 60, alkaline phosphatase 100, total bilirubin 0.3, , pregnancy negative, fibrosis score F0, inflammation A1, HCV quant 110, hepatitis A total antibody positive, HIV negative, INR 0.98, TSH normal, CBC normal, hepatitis B DNA quant 401 million, hepatitis B virus profile shows positive hepatitis B surface antigen, positive hepatitis B antigen, positive hepatitis B core total antibody, negative hepatitis B core IgM, negative hepatitis B E antibody, negative hepatitis B surface antibody, negative hepatitis delta virus, lipid panel normal, urinalysis large amount of blood.     Ultrasound liver 10/2/2020:  There is a lesion in the posterior aspect of the liver  measuring about 2.3 cm that could represent a simple cyst, however MRI  multiphase is recommended for further evaluation.    Assessment:  1. Chronic hepatitis B (CMS/HCC)    2. Elevated alkaline phosphatase level      Plan:  Orders Placed This  Encounter   Procedures   • Hepatitis B DNA, Quantitative, PCR   • CBC Auto Differential   • Mitochondrial Antibodies, M2   • Hepatitis B Surface Antigen     Will complete labs today that were not resulted on last labs (quantity not sufficient). Continue baraclude for treatment of Hepatitis B infection. Will give more recommendations after these results have been reviewed. It is important that she never miss days of this medication, could result in fulminant hepatitis. She also has Hepatitis C but is waiting for suppression of hepatitis B virus before starting this treatment. Has had elevated alk phos on past labs, will add AMA lab checking for primary biliary cholangitis.         Return in about 3 months (around 4/13/2021) for recheck Hep B (will need labs prior).      Electronically signed 1/14/2021 14:33 LAUREN Mesa PA-C  [facility]

## 2021-01-14 ENCOUNTER — TELEPHONE (OUTPATIENT)
Dept: GASTROENTEROLOGY | Facility: CLINIC | Age: 41
End: 2021-01-14

## 2021-01-14 LAB
HBV DNA SERPL NAA+PROBE-ACNC: 1540 IU/ML
HBV DNA SERPL NAA+PROBE-LOG IU: 3.19 LOG10 IU/ML
MITOCHONDRIA M2 IGG SER-ACNC: <20 UNITS (ref 0–20)
REF LAB TEST REF RANGE: NORMAL

## 2021-01-14 NOTE — TELEPHONE ENCOUNTER
Lab called stating that the Hep B Surface Antigen lab needs redrawn because there was not enough to test.

## 2021-01-20 ENCOUNTER — TELEPHONE (OUTPATIENT)
Dept: GASTROENTEROLOGY | Facility: CLINIC | Age: 41
End: 2021-01-20

## 2021-01-20 DIAGNOSIS — B18.1 HEPATITIS B, CHRONIC (HCC): Primary | ICD-10-CM

## 2021-01-20 NOTE — TELEPHONE ENCOUNTER
Please let pt know that Hep B medication (baraclude) is working well. Her Hep B virus number has decreased from 401 million to now 1,540. She should continue this medication and plan for labs again in 3 months, please arrange f/u 1 week after next labs to discuss results.